# Patient Record
Sex: MALE | Race: BLACK OR AFRICAN AMERICAN | NOT HISPANIC OR LATINO
[De-identification: names, ages, dates, MRNs, and addresses within clinical notes are randomized per-mention and may not be internally consistent; named-entity substitution may affect disease eponyms.]

---

## 2018-07-31 ENCOUNTER — APPOINTMENT (OUTPATIENT)
Dept: SURGERY | Facility: CLINIC | Age: 43
End: 2018-07-31
Payer: COMMERCIAL

## 2018-07-31 VITALS
TEMPERATURE: 98.3 F | OXYGEN SATURATION: 98 % | HEART RATE: 61 BPM | SYSTOLIC BLOOD PRESSURE: 111 MMHG | DIASTOLIC BLOOD PRESSURE: 74 MMHG | WEIGHT: 285.38 LBS | BODY MASS INDEX: 36.64 KG/M2

## 2018-07-31 LAB
CA-I SERPL-SCNC: 1.24 MMOL/L
INR PPP: 1.03 RATIO
PT BLD: 11.7 SEC

## 2018-07-31 PROCEDURE — 99213 OFFICE O/P EST LOW 20 MIN: CPT

## 2018-08-01 LAB
25(OH)D3 SERPL-MCNC: 13.8 NG/ML
ALBUMIN SERPL ELPH-MCNC: 4.6 G/DL
ALP BLD-CCNC: 65 U/L
ALT SERPL-CCNC: 16 U/L
ANION GAP SERPL CALC-SCNC: 12 MMOL/L
AST SERPL-CCNC: 14 U/L
BASOPHILS # BLD AUTO: 0.01 K/UL
BASOPHILS NFR BLD AUTO: 0.2 %
BILIRUB SERPL-MCNC: 0.5 MG/DL
BUN SERPL-MCNC: 10 MG/DL
CALCIUM SERPL-MCNC: 9.7 MG/DL
CALCIUM SERPL-MCNC: 9.7 MG/DL
CHLORIDE SERPL-SCNC: 102 MMOL/L
CHOLEST SERPL-MCNC: 201 MG/DL
CHOLEST/HDLC SERPL: 3.5 RATIO
CO2 SERPL-SCNC: 29 MMOL/L
CREAT SERPL-MCNC: 0.81 MG/DL
EOSINOPHIL # BLD AUTO: 0.04 K/UL
EOSINOPHIL NFR BLD AUTO: 0.8 %
FOLATE SERPL-MCNC: 9 NG/ML
GLUCOSE SERPL-MCNC: 87 MG/DL
HBA1C MFR BLD HPLC: 6 %
HCT VFR BLD CALC: 40.9 %
HDLC SERPL-MCNC: 58 MG/DL
HGB BLD-MCNC: 12.4 G/DL
IMM GRANULOCYTES NFR BLD AUTO: 0.2 %
IRON SATN MFR SERPL: 28 %
IRON SERPL-MCNC: 77 UG/DL
LDLC SERPL CALC-MCNC: 122 MG/DL
LYMPHOCYTES # BLD AUTO: 1.86 K/UL
LYMPHOCYTES NFR BLD AUTO: 36.2 %
MAN DIFF?: NORMAL
MCHC RBC-ENTMCNC: 21.3 PG
MCHC RBC-ENTMCNC: 30.3 GM/DL
MCV RBC AUTO: 70.2 FL
MONOCYTES # BLD AUTO: 0.6 K/UL
MONOCYTES NFR BLD AUTO: 11.7 %
NEUTROPHILS # BLD AUTO: 2.62 K/UL
NEUTROPHILS NFR BLD AUTO: 50.9 %
PARATHYROID HORMONE INTACT: 71 PG/ML
PLATELET # BLD AUTO: 212 K/UL
POTASSIUM SERPL-SCNC: 4.4 MMOL/L
PREALB SERPL NEPH-MCNC: 24 MG/DL
PROT SERPL-MCNC: 7.6 G/DL
RBC # BLD: 5.83 M/UL
RBC # FLD: 16.4 %
SODIUM SERPL-SCNC: 143 MMOL/L
TIBC SERPL-MCNC: 277 UG/DL
TRIGL SERPL-MCNC: 106 MG/DL
TSH SERPL-ACNC: 1.55 UIU/ML
UIBC SERPL-MCNC: 200 UG/DL
VIT B12 SERPL-MCNC: 381 PG/ML
WBC # FLD AUTO: 5.14 K/UL

## 2018-08-03 LAB — ZINC SERPL-MCNC: 65 UG/DL

## 2018-08-04 LAB — VIT A SERPL-MCNC: 34 UG/DL

## 2018-08-05 LAB — VIT B1 SERPL-MCNC: 69.8 NMOL/L

## 2018-08-06 ENCOUNTER — CLINICAL ADVICE (OUTPATIENT)
Age: 43
End: 2018-08-06

## 2018-08-06 LAB
A-TOCOPHEROL VIT E SERPL-MCNC: 9.2 MG/L
BETA+GAMMA TOCOPHEROL SERPL-MCNC: 1.3 MG/L

## 2018-08-06 RX ORDER — ERGOCALCIFEROL 1.25 MG/1
1.25 MG CAPSULE, LIQUID FILLED ORAL
Qty: 4 | Refills: 2 | Status: ACTIVE | COMMUNITY
Start: 2018-08-06 | End: 1900-01-01

## 2018-09-04 ENCOUNTER — APPOINTMENT (OUTPATIENT)
Dept: SURGERY | Facility: CLINIC | Age: 43
End: 2018-09-04
Payer: COMMERCIAL

## 2018-09-04 VITALS
SYSTOLIC BLOOD PRESSURE: 120 MMHG | HEIGHT: 74 IN | BODY MASS INDEX: 36.57 KG/M2 | WEIGHT: 285 LBS | OXYGEN SATURATION: 95 % | TEMPERATURE: 98.5 F | HEART RATE: 67 BPM | DIASTOLIC BLOOD PRESSURE: 80 MMHG

## 2018-09-04 PROCEDURE — 99213 OFFICE O/P EST LOW 20 MIN: CPT

## 2018-09-18 ENCOUNTER — APPOINTMENT (OUTPATIENT)
Dept: SURGERY | Facility: CLINIC | Age: 43
End: 2018-09-18

## 2018-10-09 ENCOUNTER — APPOINTMENT (OUTPATIENT)
Dept: SURGERY | Facility: CLINIC | Age: 43
End: 2018-10-09
Payer: COMMERCIAL

## 2018-10-09 VITALS
OXYGEN SATURATION: 96 % | DIASTOLIC BLOOD PRESSURE: 78 MMHG | WEIGHT: 281 LBS | HEIGHT: 74 IN | SYSTOLIC BLOOD PRESSURE: 119 MMHG | TEMPERATURE: 97.4 F | BODY MASS INDEX: 36.06 KG/M2 | HEART RATE: 84 BPM

## 2018-10-09 PROCEDURE — 99212 OFFICE O/P EST SF 10 MIN: CPT

## 2018-10-30 ENCOUNTER — OUTPATIENT (OUTPATIENT)
Dept: OUTPATIENT SERVICES | Facility: HOSPITAL | Age: 43
LOS: 1 days | End: 2018-10-30
Payer: COMMERCIAL

## 2018-10-30 DIAGNOSIS — Z01.818 ENCOUNTER FOR OTHER PREPROCEDURAL EXAMINATION: ICD-10-CM

## 2018-10-30 LAB
ALBUMIN SERPL ELPH-MCNC: 4.2 G/DL — SIGNIFICANT CHANGE UP (ref 3.3–5)
ALP SERPL-CCNC: 59 U/L — SIGNIFICANT CHANGE UP (ref 40–120)
ALT FLD-CCNC: 25 U/L — SIGNIFICANT CHANGE UP (ref 10–45)
ANION GAP SERPL CALC-SCNC: 12 MMOL/L — SIGNIFICANT CHANGE UP (ref 5–17)
APPEARANCE UR: CLEAR — SIGNIFICANT CHANGE UP
APTT BLD: 37 SEC — HIGH (ref 27.5–36.3)
AST SERPL-CCNC: 17 U/L — SIGNIFICANT CHANGE UP (ref 10–40)
BACTERIA # UR AUTO: PRESENT /HPF
BASOPHILS NFR BLD AUTO: 0.2 % — SIGNIFICANT CHANGE UP (ref 0–2)
BILIRUB SERPL-MCNC: 0.4 MG/DL — SIGNIFICANT CHANGE UP (ref 0.2–1.2)
BILIRUB UR-MCNC: NEGATIVE — SIGNIFICANT CHANGE UP
BLD GP AB SCN SERPL QL: NEGATIVE — SIGNIFICANT CHANGE UP
BUN SERPL-MCNC: 11 MG/DL — SIGNIFICANT CHANGE UP (ref 7–23)
CALCIUM SERPL-MCNC: 9.5 MG/DL — SIGNIFICANT CHANGE UP (ref 8.4–10.5)
CHLORIDE SERPL-SCNC: 100 MMOL/L — SIGNIFICANT CHANGE UP (ref 96–108)
CO2 SERPL-SCNC: 29 MMOL/L — SIGNIFICANT CHANGE UP (ref 22–31)
COLOR SPEC: YELLOW — SIGNIFICANT CHANGE UP
CREAT SERPL-MCNC: 0.77 MG/DL — SIGNIFICANT CHANGE UP (ref 0.5–1.3)
DIFF PNL FLD: ABNORMAL
EOSINOPHIL NFR BLD AUTO: 1.1 % — SIGNIFICANT CHANGE UP (ref 0–6)
EPI CELLS # UR: SIGNIFICANT CHANGE UP /HPF (ref 0–5)
GLUCOSE SERPL-MCNC: 84 MG/DL — SIGNIFICANT CHANGE UP (ref 70–99)
GLUCOSE UR QL: NEGATIVE — SIGNIFICANT CHANGE UP
HBA1C BLD-MCNC: 5.5 % — SIGNIFICANT CHANGE UP (ref 4–5.6)
HCG UR QL: NEGATIVE — SIGNIFICANT CHANGE UP
HCT VFR BLD CALC: 41.4 % — SIGNIFICANT CHANGE UP (ref 39–50)
HGB BLD-MCNC: 12.7 G/DL — LOW (ref 13–17)
INR BLD: 1.01 — SIGNIFICANT CHANGE UP (ref 0.88–1.16)
KETONES UR-MCNC: NEGATIVE — SIGNIFICANT CHANGE UP
LEUKOCYTE ESTERASE UR-ACNC: NEGATIVE — SIGNIFICANT CHANGE UP
LYMPHOCYTES # BLD AUTO: 33.1 % — SIGNIFICANT CHANGE UP (ref 13–44)
MCHC RBC-ENTMCNC: 21.8 PG — LOW (ref 27–34)
MCHC RBC-ENTMCNC: 30.7 G/DL — LOW (ref 32–36)
MCV RBC AUTO: 71 FL — LOW (ref 80–100)
MONOCYTES NFR BLD AUTO: 12.9 % — SIGNIFICANT CHANGE UP (ref 2–14)
NEUTROPHILS NFR BLD AUTO: 52.7 % — SIGNIFICANT CHANGE UP (ref 43–77)
NITRITE UR-MCNC: NEGATIVE — SIGNIFICANT CHANGE UP
PH UR: 6 — SIGNIFICANT CHANGE UP (ref 5–8)
PLATELET # BLD AUTO: 203 K/UL — SIGNIFICANT CHANGE UP (ref 150–400)
POTASSIUM SERPL-MCNC: 4.2 MMOL/L — SIGNIFICANT CHANGE UP (ref 3.5–5.3)
POTASSIUM SERPL-SCNC: 4.2 MMOL/L — SIGNIFICANT CHANGE UP (ref 3.5–5.3)
PROT SERPL-MCNC: 7.2 G/DL — SIGNIFICANT CHANGE UP (ref 6–8.3)
PROT UR-MCNC: NEGATIVE MG/DL — SIGNIFICANT CHANGE UP
PROTHROM AB SERPL-ACNC: 11.4 SEC — SIGNIFICANT CHANGE UP (ref 10–12.9)
RBC # BLD: 5.83 M/UL — HIGH (ref 4.2–5.8)
RBC # FLD: 16.5 % — SIGNIFICANT CHANGE UP (ref 10.3–16.9)
RBC CASTS # UR COMP ASSIST: < 5 /HPF — SIGNIFICANT CHANGE UP
RH IG SCN BLD-IMP: POSITIVE — SIGNIFICANT CHANGE UP
SODIUM SERPL-SCNC: 141 MMOL/L — SIGNIFICANT CHANGE UP (ref 135–145)
SP GR SPEC: 1.02 — SIGNIFICANT CHANGE UP (ref 1–1.03)
TSH SERPL-MCNC: 0.94 UIU/ML — SIGNIFICANT CHANGE UP (ref 0.35–4.94)
UROBILINOGEN FLD QL: 0.2 E.U./DL — SIGNIFICANT CHANGE UP
WBC # BLD: 5.3 K/UL — SIGNIFICANT CHANGE UP (ref 3.8–10.5)
WBC # FLD AUTO: 5.3 K/UL — SIGNIFICANT CHANGE UP (ref 3.8–10.5)
WBC UR QL: < 5 /HPF — SIGNIFICANT CHANGE UP

## 2018-10-30 PROCEDURE — 93010 ELECTROCARDIOGRAM REPORT: CPT

## 2018-10-30 PROCEDURE — 71046 X-RAY EXAM CHEST 2 VIEWS: CPT

## 2018-10-30 PROCEDURE — 84443 ASSAY THYROID STIM HORMONE: CPT

## 2018-10-30 PROCEDURE — 81001 URINALYSIS AUTO W/SCOPE: CPT

## 2018-10-30 PROCEDURE — 83036 HEMOGLOBIN GLYCOSYLATED A1C: CPT

## 2018-10-30 PROCEDURE — 85610 PROTHROMBIN TIME: CPT

## 2018-10-30 PROCEDURE — 86901 BLOOD TYPING SEROLOGIC RH(D): CPT

## 2018-10-30 PROCEDURE — 71046 X-RAY EXAM CHEST 2 VIEWS: CPT | Mod: 26

## 2018-10-30 PROCEDURE — 81025 URINE PREGNANCY TEST: CPT

## 2018-10-30 PROCEDURE — 86850 RBC ANTIBODY SCREEN: CPT

## 2018-10-30 PROCEDURE — 93005 ELECTROCARDIOGRAM TRACING: CPT

## 2018-10-30 PROCEDURE — 85025 COMPLETE CBC W/AUTO DIFF WBC: CPT

## 2018-10-30 PROCEDURE — 80053 COMPREHEN METABOLIC PANEL: CPT

## 2018-10-30 PROCEDURE — 85730 THROMBOPLASTIN TIME PARTIAL: CPT

## 2018-10-30 PROCEDURE — 86900 BLOOD TYPING SEROLOGIC ABO: CPT

## 2018-11-05 ENCOUNTER — RESULT REVIEW (OUTPATIENT)
Age: 43
End: 2018-11-05

## 2018-11-05 ENCOUNTER — OUTPATIENT (OUTPATIENT)
Dept: OUTPATIENT SERVICES | Facility: HOSPITAL | Age: 43
LOS: 1 days | Discharge: HOME | End: 2018-11-05
Payer: COMMERCIAL

## 2018-11-05 VITALS
SYSTOLIC BLOOD PRESSURE: 129 MMHG | TEMPERATURE: 98 F | HEART RATE: 68 BPM | HEIGHT: 74 IN | DIASTOLIC BLOOD PRESSURE: 80 MMHG | WEIGHT: 292.77 LBS | RESPIRATION RATE: 20 BRPM | OXYGEN SATURATION: 96 %

## 2018-11-05 VITALS
DIASTOLIC BLOOD PRESSURE: 80 MMHG | RESPIRATION RATE: 14 BRPM | SYSTOLIC BLOOD PRESSURE: 137 MMHG | OXYGEN SATURATION: 99 % | HEART RATE: 82 BPM

## 2018-11-05 DIAGNOSIS — Z90.3 ACQUIRED ABSENCE OF STOMACH [PART OF]: Chronic | ICD-10-CM

## 2018-11-05 PROCEDURE — 88341 IMHCHEM/IMCYTCHM EA ADD ANTB: CPT

## 2018-11-05 PROCEDURE — 43239 EGD BIOPSY SINGLE/MULTIPLE: CPT

## 2018-11-05 PROCEDURE — 88305 TISSUE EXAM BY PATHOLOGIST: CPT

## 2018-11-05 RX ORDER — SODIUM CHLORIDE 9 MG/ML
1000 INJECTION, SOLUTION INTRAVENOUS
Qty: 0 | Refills: 0 | Status: DISCONTINUED | OUTPATIENT
Start: 2018-11-05 | End: 2018-11-05

## 2018-11-05 RX ORDER — ONDANSETRON 8 MG/1
4 TABLET, FILM COATED ORAL ONCE
Qty: 0 | Refills: 0 | Status: DISCONTINUED | OUTPATIENT
Start: 2018-11-05 | End: 2018-11-05

## 2018-11-05 NOTE — BRIEF OPERATIVE NOTE - OPERATION/FINDINGS
Diaphragm at 40cm, squamocolumnar junction at 38cm -- suggestion of hiatal hernia, biopsies x 4 obtained.

## 2018-11-05 NOTE — BRIEF OPERATIVE NOTE - PROCEDURE
<<-----Click on this checkbox to enter Procedure Endoscopy in operating room  11/05/2018    Active  CCRIPPS

## 2018-11-05 NOTE — PACU DISCHARGE NOTE - COMMENTS
Discharge Instructions reviewed with patient using teach back method. Copies given. Given Ginger Ale tolerated. IV removed intact. No signs of phlebitis. Ambulating steadily. Discharged accompanied by brother

## 2018-11-06 ENCOUNTER — APPOINTMENT (OUTPATIENT)
Dept: SURGERY | Facility: CLINIC | Age: 43
End: 2018-11-06
Payer: COMMERCIAL

## 2018-11-06 ENCOUNTER — LABORATORY RESULT (OUTPATIENT)
Age: 43
End: 2018-11-06

## 2018-11-06 VITALS
DIASTOLIC BLOOD PRESSURE: 63 MMHG | TEMPERATURE: 97.8 F | SYSTOLIC BLOOD PRESSURE: 95 MMHG | OXYGEN SATURATION: 99 % | BODY MASS INDEX: 36.32 KG/M2 | HEIGHT: 74 IN | HEART RATE: 105 BPM | WEIGHT: 283 LBS

## 2018-11-06 LAB
BASOPHILS # BLD AUTO: 0 K/UL
BASOPHILS NFR BLD AUTO: 0 %
EOSINOPHIL # BLD AUTO: 0 K/UL
EOSINOPHIL NFR BLD AUTO: 0 %
HCT VFR BLD CALC: 34.3 %
HGB BLD-MCNC: 10.7 G/DL
LYMPHOCYTES # BLD AUTO: 2.38 K/UL
LYMPHOCYTES NFR BLD AUTO: 25.9 %
MAN DIFF?: NORMAL
MCHC RBC-ENTMCNC: 21.9 PG
MCHC RBC-ENTMCNC: 31.2 GM/DL
MCV RBC AUTO: 70.3 FL
MONOCYTES # BLD AUTO: 0.95 K/UL
MONOCYTES NFR BLD AUTO: 10.3 %
NEUTROPHILS # BLD AUTO: 5.78 K/UL
NEUTROPHILS NFR BLD AUTO: 62.9 %
PLATELET # BLD AUTO: 215 K/UL
RBC # BLD: 4.88 M/UL
RBC # FLD: 16.6 %
SURGICAL PATHOLOGY STUDY: SIGNIFICANT CHANGE UP
WBC # FLD AUTO: 9.19 K/UL

## 2018-11-06 PROCEDURE — 99212 OFFICE O/P EST SF 10 MIN: CPT

## 2018-11-09 ENCOUNTER — APPOINTMENT (OUTPATIENT)
Dept: SURGERY | Facility: CLINIC | Age: 43
End: 2018-11-09

## 2018-11-09 PROBLEM — M10.9 GOUT, UNSPECIFIED: Chronic | Status: ACTIVE | Noted: 2018-11-05

## 2018-11-09 PROBLEM — I10 ESSENTIAL (PRIMARY) HYPERTENSION: Chronic | Status: ACTIVE | Noted: 2018-11-05

## 2018-11-09 PROBLEM — E66.01 MORBID (SEVERE) OBESITY DUE TO EXCESS CALORIES: Chronic | Status: ACTIVE | Noted: 2018-11-05

## 2018-11-19 LAB
BASOPHILS # BLD AUTO: 0.02 K/UL
BASOPHILS NFR BLD AUTO: 0.4 %
EOSINOPHIL # BLD AUTO: 0.04 K/UL
EOSINOPHIL NFR BLD AUTO: 0.8 %
HCT VFR BLD CALC: 31.4 %
HGB BLD-MCNC: 9.5 G/DL
IMM GRANULOCYTES NFR BLD AUTO: 0.2 %
LYMPHOCYTES # BLD AUTO: 1.88 K/UL
LYMPHOCYTES NFR BLD AUTO: 39.7 %
MAN DIFF?: NORMAL
MCHC RBC-ENTMCNC: 22.1 PG
MCHC RBC-ENTMCNC: 30.3 GM/DL
MCV RBC AUTO: 73 FL
MONOCYTES # BLD AUTO: 0.54 K/UL
MONOCYTES NFR BLD AUTO: 11.4 %
NEUTROPHILS # BLD AUTO: 2.25 K/UL
NEUTROPHILS NFR BLD AUTO: 47.5 %
PLATELET # BLD AUTO: 256 K/UL
RBC # BLD: 4.3 M/UL
RBC # FLD: 17 %
WBC # FLD AUTO: 4.74 K/UL

## 2018-12-13 DIAGNOSIS — E55.9 VITAMIN D DEFICIENCY, UNSPECIFIED: ICD-10-CM

## 2018-12-14 VITALS — WEIGHT: 286.5 LBS | BODY MASS INDEX: 36.77 KG/M2 | HEIGHT: 74 IN

## 2018-12-24 RX ORDER — ERGOCALCIFEROL 1.25 MG/1
1.25 MG CAPSULE, LIQUID FILLED ORAL
Qty: 12 | Refills: 0 | Status: ACTIVE | COMMUNITY
Start: 2018-12-13

## 2019-01-08 ENCOUNTER — APPOINTMENT (OUTPATIENT)
Dept: SURGERY | Facility: CLINIC | Age: 44
End: 2019-01-08
Payer: COMMERCIAL

## 2019-01-08 VITALS
OXYGEN SATURATION: 98 % | BODY MASS INDEX: 36.83 KG/M2 | HEIGHT: 74 IN | TEMPERATURE: 97.7 F | SYSTOLIC BLOOD PRESSURE: 129 MMHG | HEART RATE: 74 BPM | DIASTOLIC BLOOD PRESSURE: 74 MMHG | WEIGHT: 287 LBS

## 2019-01-08 DIAGNOSIS — Z98.84 BARIATRIC SURGERY STATUS: ICD-10-CM

## 2019-01-08 PROCEDURE — 99213 OFFICE O/P EST LOW 20 MIN: CPT

## 2019-01-08 NOTE — HISTORY OF PRESENT ILLNESS
[de-identified] : Pt is a 44 y/o M who presents today for his final visit for bariatric surgery. Pt has completed all of his bariatric workup and monthly weigh ins. Pt is scheduled in february for revision surgery. All questions were answered, pt is ready for his procedure.

## 2019-02-01 VITALS
DIASTOLIC BLOOD PRESSURE: 73 MMHG | HEIGHT: 74 IN | TEMPERATURE: 98 F | RESPIRATION RATE: 18 BRPM | WEIGHT: 283.73 LBS | HEART RATE: 76 BPM | OXYGEN SATURATION: 99 % | SYSTOLIC BLOOD PRESSURE: 123 MMHG

## 2019-02-04 ENCOUNTER — RESULT REVIEW (OUTPATIENT)
Age: 44
End: 2019-02-04

## 2019-02-04 ENCOUNTER — APPOINTMENT (OUTPATIENT)
Age: 44
End: 2019-02-04

## 2019-02-04 ENCOUNTER — INPATIENT (INPATIENT)
Facility: HOSPITAL | Age: 44
LOS: 1 days | Discharge: HOME | DRG: 621 | End: 2019-02-06
Attending: SURGERY | Admitting: SURGERY
Payer: COMMERCIAL

## 2019-02-04 DIAGNOSIS — Z90.3 ACQUIRED ABSENCE OF STOMACH [PART OF]: Chronic | ICD-10-CM

## 2019-02-04 LAB
BLD GP AB SCN SERPL QL: NEGATIVE — SIGNIFICANT CHANGE UP
HCT VFR BLD CALC: 30.4 % — LOW (ref 39–50)
HGB BLD-MCNC: 9.1 G/DL — LOW (ref 13–17)
MCHC RBC-ENTMCNC: 18.4 PG — LOW (ref 27–34)
MCHC RBC-ENTMCNC: 29.9 G/DL — LOW (ref 32–36)
MCV RBC AUTO: 61.5 FL — LOW (ref 80–100)
PLATELET # BLD AUTO: 243 K/UL — SIGNIFICANT CHANGE UP (ref 150–400)
RBC # BLD: 4.94 M/UL — SIGNIFICANT CHANGE UP (ref 4.2–5.8)
RBC # FLD: 17.4 % — HIGH (ref 10.3–16.9)
RH IG SCN BLD-IMP: POSITIVE — SIGNIFICANT CHANGE UP
WBC # BLD: 8.4 K/UL — SIGNIFICANT CHANGE UP (ref 3.8–10.5)
WBC # FLD AUTO: 8.4 K/UL — SIGNIFICANT CHANGE UP (ref 3.8–10.5)

## 2019-02-04 PROCEDURE — 43645 LAP GASTR BYPASS INCL SMLL I: CPT | Mod: GC

## 2019-02-04 RX ORDER — ONDANSETRON 8 MG/1
4 TABLET, FILM COATED ORAL EVERY 6 HOURS
Qty: 0 | Refills: 0 | Status: DISCONTINUED | OUTPATIENT
Start: 2019-02-04 | End: 2019-02-06

## 2019-02-04 RX ORDER — HYDROMORPHONE HYDROCHLORIDE 2 MG/ML
0.5 INJECTION INTRAMUSCULAR; INTRAVENOUS; SUBCUTANEOUS
Qty: 0 | Refills: 0 | Status: DISCONTINUED | OUTPATIENT
Start: 2019-02-04 | End: 2019-02-05

## 2019-02-04 RX ORDER — SODIUM CHLORIDE 9 MG/ML
1000 INJECTION, SOLUTION INTRAVENOUS
Qty: 0 | Refills: 0 | Status: DISCONTINUED | OUTPATIENT
Start: 2019-02-04 | End: 2019-02-05

## 2019-02-04 RX ORDER — HYDROMORPHONE HYDROCHLORIDE 2 MG/ML
0.5 INJECTION INTRAMUSCULAR; INTRAVENOUS; SUBCUTANEOUS ONCE
Qty: 0 | Refills: 0 | Status: DISCONTINUED | OUTPATIENT
Start: 2019-02-04 | End: 2019-02-04

## 2019-02-04 RX ORDER — ENOXAPARIN SODIUM 100 MG/ML
40 INJECTION SUBCUTANEOUS ONCE
Qty: 0 | Refills: 0 | Status: COMPLETED | OUTPATIENT
Start: 2019-02-04 | End: 2019-02-04

## 2019-02-04 RX ORDER — INDOMETHACIN 50 MG
1 CAPSULE ORAL
Qty: 0 | Refills: 0 | COMMUNITY

## 2019-02-04 RX ORDER — SCOPALAMINE 1 MG/3D
1 PATCH, EXTENDED RELEASE TRANSDERMAL ONCE
Qty: 0 | Refills: 0 | Status: COMPLETED | OUTPATIENT
Start: 2019-02-04 | End: 2019-02-04

## 2019-02-04 RX ORDER — COLCHICINE 0.6 MG
1 TABLET ORAL
Qty: 0 | Refills: 0 | COMMUNITY

## 2019-02-04 RX ORDER — METOCLOPRAMIDE HCL 10 MG
10 TABLET ORAL ONCE
Qty: 0 | Refills: 0 | Status: DISCONTINUED | OUTPATIENT
Start: 2019-02-04 | End: 2019-02-06

## 2019-02-04 RX ORDER — ENOXAPARIN SODIUM 100 MG/ML
30 INJECTION SUBCUTANEOUS EVERY 12 HOURS
Qty: 0 | Refills: 0 | Status: DISCONTINUED | OUTPATIENT
Start: 2019-02-04 | End: 2019-02-06

## 2019-02-04 RX ORDER — HYDROMORPHONE HYDROCHLORIDE 2 MG/ML
1 INJECTION INTRAMUSCULAR; INTRAVENOUS; SUBCUTANEOUS
Qty: 0 | Refills: 0 | Status: DISCONTINUED | OUTPATIENT
Start: 2019-02-04 | End: 2019-02-05

## 2019-02-04 RX ORDER — PANTOPRAZOLE SODIUM 20 MG/1
40 TABLET, DELAYED RELEASE ORAL DAILY
Qty: 0 | Refills: 0 | Status: DISCONTINUED | OUTPATIENT
Start: 2019-02-04 | End: 2019-02-06

## 2019-02-04 RX ADMIN — HYDROMORPHONE HYDROCHLORIDE 1 MILLIGRAM(S): 2 INJECTION INTRAMUSCULAR; INTRAVENOUS; SUBCUTANEOUS at 14:44

## 2019-02-04 RX ADMIN — SCOPALAMINE 1 PATCH: 1 PATCH, EXTENDED RELEASE TRANSDERMAL at 06:44

## 2019-02-04 RX ADMIN — HYDROMORPHONE HYDROCHLORIDE 0.5 MILLIGRAM(S): 2 INJECTION INTRAMUSCULAR; INTRAVENOUS; SUBCUTANEOUS at 12:12

## 2019-02-04 RX ADMIN — HYDROMORPHONE HYDROCHLORIDE 1 MILLIGRAM(S): 2 INJECTION INTRAMUSCULAR; INTRAVENOUS; SUBCUTANEOUS at 15:25

## 2019-02-04 RX ADMIN — ENOXAPARIN SODIUM 30 MILLIGRAM(S): 100 INJECTION SUBCUTANEOUS at 22:09

## 2019-02-04 RX ADMIN — HYDROMORPHONE HYDROCHLORIDE 1 MILLIGRAM(S): 2 INJECTION INTRAMUSCULAR; INTRAVENOUS; SUBCUTANEOUS at 17:59

## 2019-02-04 RX ADMIN — HYDROMORPHONE HYDROCHLORIDE 0.5 MILLIGRAM(S): 2 INJECTION INTRAMUSCULAR; INTRAVENOUS; SUBCUTANEOUS at 12:25

## 2019-02-04 RX ADMIN — SCOPALAMINE 1 PATCH: 1 PATCH, EXTENDED RELEASE TRANSDERMAL at 18:02

## 2019-02-04 RX ADMIN — HYDROMORPHONE HYDROCHLORIDE 1 MILLIGRAM(S): 2 INJECTION INTRAMUSCULAR; INTRAVENOUS; SUBCUTANEOUS at 21:16

## 2019-02-04 RX ADMIN — HYDROMORPHONE HYDROCHLORIDE 0.5 MILLIGRAM(S): 2 INJECTION INTRAMUSCULAR; INTRAVENOUS; SUBCUTANEOUS at 11:53

## 2019-02-04 RX ADMIN — HYDROMORPHONE HYDROCHLORIDE 1 MILLIGRAM(S): 2 INJECTION INTRAMUSCULAR; INTRAVENOUS; SUBCUTANEOUS at 22:16

## 2019-02-04 RX ADMIN — HYDROMORPHONE HYDROCHLORIDE 1 MILLIGRAM(S): 2 INJECTION INTRAMUSCULAR; INTRAVENOUS; SUBCUTANEOUS at 18:10

## 2019-02-04 RX ADMIN — ENOXAPARIN SODIUM 40 MILLIGRAM(S): 100 INJECTION SUBCUTANEOUS at 06:44

## 2019-02-04 NOTE — PROGRESS NOTE ADULT - SUBJECTIVE AND OBJECTIVE BOX
Surgery Post-Op Note    Pre-Op Dx: morbid obesity  Procedure: Laparoscopic gastric bypass    Surgeon: Dean    Subjective: Pain controlled. Denies N/V, CP, SOB.      Vital Signs Last 24 Hrs  T(C): 36.4 (04 Feb 2019 11:45), Max: 36.4 (04 Feb 2019 11:45)  T(F): 97.6 (04 Feb 2019 11:45), Max: 97.6 (04 Feb 2019 11:45)  HR: 73 (04 Feb 2019 13:30) (73 - 80)  BP: 130/77 (04 Feb 2019 13:30) (120/63 - 133/75)  BP(mean): 90 (04 Feb 2019 12:45) (83 - 91)  RR: 18 (04 Feb 2019 13:30) (15 - 20)  SpO2: 100% (04 Feb 2019 13:30) (98% - 100%)    Physical Exam:  General: NAD, resting comfortably in bed  Pulmonary: Nonlabored breathing, no respiratory distress  Cardiovascular: NSR  Abdominal: soft, NT/ND, incisions CDI  Extremities: WWP, normal strength  Neuro: A/O x 3, CNs II-XII grossly intact, no focal deficits, normal sensation        LABS:            CAPILLARY BLOOD GLUCOSE            ABO Interpretation: O (02-04 @ 06:38)

## 2019-02-04 NOTE — BRIEF OPERATIVE NOTE - OPERATION/FINDINGS
Findings of sleeve gastrectomy. Adhesions to liver and omentum taken down sharply. Hiatal hernia repaired with running 1-Prolene barbed suture. Gastric pouch created over 32 Fr bougie. Tiny residual portion of fundus resected. Gastrojejunostomy done in two layers hand-sewn with 2-0 Ethibond in an ante-colic, retro-gastric fashion. Leak test with methylene blue negative. BP limb 150 cm, Lexii limb - 150 cm. Mesenteric defects closed with 0-Prolene barbed suture.

## 2019-02-04 NOTE — BRIEF OPERATIVE NOTE - PROCEDURE
<<-----Click on this checkbox to enter Procedure Laparoscopic gastric bypass  02/04/2019    Active  REMY

## 2019-02-04 NOTE — H&P ADULT - PMH
Gout    HTN (hypertension)    Hypercholesteremia    Morbid obesity    EDILMA (obstructive sleep apnea)  does not use CPAP

## 2019-02-04 NOTE — H&P ADULT - HISTORY OF PRESENT ILLNESS
42 yo M with morbid obesity - BMI 37 - and history of sleeve gastrectomy in 2015 with 130lb weight loss, now presenting with weight gain.  He is scheduled for gastric bypass today. PMH includes HTN, EDILMA and hypercholesterolemia

## 2019-02-04 NOTE — PROGRESS NOTE ADULT - ASSESSMENT
44 yo M w/ hx gout, HTN, EDILMA, morbid obesity (BMI 37), sleeve gastrectomy in 2015 w/ Dr. Moran, now s/p revision to RYGB and hiatal hernia repair    - pain nausea control  - NPO/IVF  - PPI  - lovenox BID  - funes  - OOB  - swallow study

## 2019-02-05 ENCOUNTER — TRANSCRIPTION ENCOUNTER (OUTPATIENT)
Age: 44
End: 2019-02-05

## 2019-02-05 LAB
ANION GAP SERPL CALC-SCNC: 11 MMOL/L — SIGNIFICANT CHANGE UP (ref 5–17)
BASOPHILS NFR BLD AUTO: 0.1 % — SIGNIFICANT CHANGE UP (ref 0–2)
BUN SERPL-MCNC: 9 MG/DL — SIGNIFICANT CHANGE UP (ref 7–23)
CALCIUM SERPL-MCNC: 9.2 MG/DL — SIGNIFICANT CHANGE UP (ref 8.4–10.5)
CHLORIDE SERPL-SCNC: 100 MMOL/L — SIGNIFICANT CHANGE UP (ref 96–108)
CO2 SERPL-SCNC: 26 MMOL/L — SIGNIFICANT CHANGE UP (ref 22–31)
CREAT SERPL-MCNC: 0.81 MG/DL — SIGNIFICANT CHANGE UP (ref 0.5–1.3)
EOSINOPHIL NFR BLD AUTO: 0 % — SIGNIFICANT CHANGE UP (ref 0–6)
GLUCOSE SERPL-MCNC: 98 MG/DL — SIGNIFICANT CHANGE UP (ref 70–99)
HCT VFR BLD CALC: 28.7 % — LOW (ref 39–50)
HGB BLD-MCNC: 8.5 G/DL — LOW (ref 13–17)
LYMPHOCYTES # BLD AUTO: 15.5 % — SIGNIFICANT CHANGE UP (ref 13–44)
MAGNESIUM SERPL-MCNC: 1.9 MG/DL — SIGNIFICANT CHANGE UP (ref 1.6–2.6)
MCHC RBC-ENTMCNC: 18.5 PG — LOW (ref 27–34)
MCHC RBC-ENTMCNC: 29.6 G/DL — LOW (ref 32–36)
MCV RBC AUTO: 62.4 FL — LOW (ref 80–100)
MONOCYTES NFR BLD AUTO: 13.8 % — SIGNIFICANT CHANGE UP (ref 2–14)
NEUTROPHILS NFR BLD AUTO: 70.6 % — SIGNIFICANT CHANGE UP (ref 43–77)
PHOSPHATE SERPL-MCNC: 3.7 MG/DL — SIGNIFICANT CHANGE UP (ref 2.5–4.5)
PLATELET # BLD AUTO: 194 K/UL — SIGNIFICANT CHANGE UP (ref 150–400)
POTASSIUM SERPL-MCNC: 4 MMOL/L — SIGNIFICANT CHANGE UP (ref 3.5–5.3)
POTASSIUM SERPL-SCNC: 4 MMOL/L — SIGNIFICANT CHANGE UP (ref 3.5–5.3)
RBC # BLD: 4.6 M/UL — SIGNIFICANT CHANGE UP (ref 4.2–5.8)
RBC # FLD: 17 % — HIGH (ref 10.3–16.9)
SODIUM SERPL-SCNC: 137 MMOL/L — SIGNIFICANT CHANGE UP (ref 135–145)
WBC # BLD: 6.8 K/UL — SIGNIFICANT CHANGE UP (ref 3.8–10.5)
WBC # FLD AUTO: 6.8 K/UL — SIGNIFICANT CHANGE UP (ref 3.8–10.5)

## 2019-02-05 PROCEDURE — 74241: CPT | Mod: 26

## 2019-02-05 RX ORDER — SODIUM CHLORIDE 9 MG/ML
1000 INJECTION, SOLUTION INTRAVENOUS
Qty: 0 | Refills: 0 | Status: DISCONTINUED | OUTPATIENT
Start: 2019-02-05 | End: 2019-02-06

## 2019-02-05 RX ORDER — HYDROMORPHONE HYDROCHLORIDE 2 MG/ML
0.5 INJECTION INTRAMUSCULAR; INTRAVENOUS; SUBCUTANEOUS ONCE
Qty: 0 | Refills: 0 | Status: DISCONTINUED | OUTPATIENT
Start: 2019-02-05 | End: 2019-02-05

## 2019-02-05 RX ORDER — OXYCODONE AND ACETAMINOPHEN 5; 325 MG/1; MG/1
1 TABLET ORAL EVERY 4 HOURS
Qty: 0 | Refills: 0 | Status: DISCONTINUED | OUTPATIENT
Start: 2019-02-05 | End: 2019-02-06

## 2019-02-05 RX ORDER — OXYCODONE AND ACETAMINOPHEN 5; 325 MG/1; MG/1
2 TABLET ORAL EVERY 4 HOURS
Qty: 0 | Refills: 0 | Status: DISCONTINUED | OUTPATIENT
Start: 2019-02-05 | End: 2019-02-06

## 2019-02-05 RX ORDER — MAGNESIUM SULFATE 500 MG/ML
1 VIAL (ML) INJECTION ONCE
Qty: 0 | Refills: 0 | Status: COMPLETED | OUTPATIENT
Start: 2019-02-05 | End: 2019-02-05

## 2019-02-05 RX ORDER — ACETAMINOPHEN 500 MG
1000 TABLET ORAL ONCE
Qty: 0 | Refills: 0 | Status: DISCONTINUED | OUTPATIENT
Start: 2019-02-05 | End: 2019-02-06

## 2019-02-05 RX ADMIN — HYDROMORPHONE HYDROCHLORIDE 0.5 MILLIGRAM(S): 2 INJECTION INTRAMUSCULAR; INTRAVENOUS; SUBCUTANEOUS at 02:20

## 2019-02-05 RX ADMIN — OXYCODONE AND ACETAMINOPHEN 2 TABLET(S): 5; 325 TABLET ORAL at 15:37

## 2019-02-05 RX ADMIN — HYDROMORPHONE HYDROCHLORIDE 0.5 MILLIGRAM(S): 2 INJECTION INTRAMUSCULAR; INTRAVENOUS; SUBCUTANEOUS at 05:46

## 2019-02-05 RX ADMIN — HYDROMORPHONE HYDROCHLORIDE 1 MILLIGRAM(S): 2 INJECTION INTRAMUSCULAR; INTRAVENOUS; SUBCUTANEOUS at 09:32

## 2019-02-05 RX ADMIN — OXYCODONE AND ACETAMINOPHEN 2 TABLET(S): 5; 325 TABLET ORAL at 23:27

## 2019-02-05 RX ADMIN — Medication 100 GRAM(S): at 08:22

## 2019-02-05 RX ADMIN — HYDROMORPHONE HYDROCHLORIDE 0.5 MILLIGRAM(S): 2 INJECTION INTRAMUSCULAR; INTRAVENOUS; SUBCUTANEOUS at 07:25

## 2019-02-05 RX ADMIN — HYDROMORPHONE HYDROCHLORIDE 0.5 MILLIGRAM(S): 2 INJECTION INTRAMUSCULAR; INTRAVENOUS; SUBCUTANEOUS at 22:15

## 2019-02-05 RX ADMIN — HYDROMORPHONE HYDROCHLORIDE 1 MILLIGRAM(S): 2 INJECTION INTRAMUSCULAR; INTRAVENOUS; SUBCUTANEOUS at 09:49

## 2019-02-05 RX ADMIN — ENOXAPARIN SODIUM 30 MILLIGRAM(S): 100 INJECTION SUBCUTANEOUS at 09:37

## 2019-02-05 RX ADMIN — HYDROMORPHONE HYDROCHLORIDE 0.5 MILLIGRAM(S): 2 INJECTION INTRAMUSCULAR; INTRAVENOUS; SUBCUTANEOUS at 21:23

## 2019-02-05 RX ADMIN — SCOPALAMINE 1 PATCH: 1 PATCH, EXTENDED RELEASE TRANSDERMAL at 20:05

## 2019-02-05 RX ADMIN — OXYCODONE AND ACETAMINOPHEN 2 TABLET(S): 5; 325 TABLET ORAL at 15:56

## 2019-02-05 RX ADMIN — PANTOPRAZOLE SODIUM 40 MILLIGRAM(S): 20 TABLET, DELAYED RELEASE ORAL at 18:37

## 2019-02-05 RX ADMIN — ENOXAPARIN SODIUM 30 MILLIGRAM(S): 100 INJECTION SUBCUTANEOUS at 18:36

## 2019-02-05 RX ADMIN — HYDROMORPHONE HYDROCHLORIDE 0.5 MILLIGRAM(S): 2 INJECTION INTRAMUSCULAR; INTRAVENOUS; SUBCUTANEOUS at 01:28

## 2019-02-05 NOTE — DISCHARGE NOTE ADULT - HOSPITAL COURSE
44 yo M w/ hx gout, HTN, EDILMA, morbid obesity (BMI 37), sleeve gastrectomy in 2015 w/ Dr. Moran. Presented to [facility] on 2/4 and underwent revision to RYGB and hiatal hernia repair. Procedure was uncomplicated and post-op course was uneventful. UGI on POD#1 was negative for leak or obstruction and diet was advanced to bariatric clear liquids. At time of discharge the patient was tolerating an adequate amount of po intake and was stable and ready for discharge with follow-up as outpatient.

## 2019-02-05 NOTE — DISCHARGE NOTE ADULT - ADDITIONAL INSTRUCTIONS
1) Please take Percocet 1 to 2 tabs by mouth every 4 to 6 hours as needed for severe pain control.  2) Please take Tylenol 650 mg every 4 to 6 hours by mouth for moderate pain control.   3) Please take Protonix 40 mg once a day by mouth.

## 2019-02-05 NOTE — PROGRESS NOTE ADULT - SUBJECTIVE AND OBJECTIVE BOX
OVERNIGHT EVENTS: pain controlled, encouraged IS, and ambulating, good urine o/p, post op h/h: 9.1/30.4  2/4: sleeve gastrectomy revision to RYGB, hiatal hernia repair; POC WNL    STATUS POST:   sleeve gastrectomy revision to RYGB, hiatal hernia repair    POST OPERATIVE DAY #: 1    SUBJECTIVE: Patient examined bedside with chief resident. Patient complains of incisional pain and nausea. Patient denies chest pain, SOB and vomiting.  Patient making adequate urine.      enoxaparin Injectable 30 milliGRAM(s) SubCutaneous every 12 hours      Vital Signs Last 24 Hrs  T(C): 36.9 (05 Feb 2019 05:25), Max: 37.1 (04 Feb 2019 14:35)  T(F): 98.4 (05 Feb 2019 05:25), Max: 98.7 (04 Feb 2019 14:35)  HR: 86 (05 Feb 2019 05:25) (58 - 86)  BP: 120/76 (05 Feb 2019 05:25) (112/72 - 143/93)  BP(mean): 90 (04 Feb 2019 12:45) (83 - 91)  RR: 17 (05 Feb 2019 05:25) (15 - 20)  SpO2: 95% (05 Feb 2019 05:25) (94% - 100%)  I&O's Detail    04 Feb 2019 07:01  -  05 Feb 2019 07:00  --------------------------------------------------------  IN:    lactated ringers.: 1050 mL  Total IN: 1050 mL    OUT:    Indwelling Catheter - Urethral: 1520 mL  Total OUT: 1520 mL    Total NET: -470 mL          General: NAD, resting comfortably in bed  C/V: NSR  Pulm: Nonlabored breathing, no respiratory distress  Abd:  Soft, non- distended, TTP around incision site, incision clean dry and intact   Extrem: WWP, no edema, SCDs in place        LABS:                        8.5    6.8   )-----------( 194      ( 05 Feb 2019 05:45 )             28.7     02-05    137  |  100  |  9   ----------------------------<  98  4.0   |  26  |  0.81    Ca    9.2      05 Feb 2019 05:45  Phos  3.7     02-05  Mg     1.9     02-05            RADIOLOGY & ADDITIONAL STUDIES:

## 2019-02-05 NOTE — DISCHARGE NOTE ADULT - PLAN OF CARE
Recovery Follow up with  in 1 week. Call the office at  to schedule your appointment. You may shower; soap and water over incision sites. Do not scrub. Pat dry when done. No tub bathing or swimming until cleared. Keep incision sites out of the sun as scars will darken. No heavy lifting (>10lbs) or strenuous exercise. Diet: Bariatric Full Fluids. 60 grams protein daily.  64 fluid ounces water daily. Drink small sips throughout the day. Continue diet as outlined by paperwork received as a pre-operative patient. You should be urinating at least 3-4x per day. Call the office if you experience increasing abdominal pain, nausea, vomiting, or temperature >100.4F.  NO ASPIRIN OR NSAIDs until approved by Dr. Moran. Avoid alcoholic beverages until cleared by Dr. Moran.

## 2019-02-05 NOTE — DIETITIAN INITIAL EVALUATION ADULT. - ENERGY NEEDS
Height: 6'2" Weight: 284lbs, IBW 190lbs+/-10%, %%, BMI 36.4  IBW used for calculations as pt >120% of IBW   Above energy needs calculated for wt maintenance (20-25kcal/kg).   Weeks 1-2 estimated needs: 861-1033kcal/day (10-12kcal/kg), 129-180g pro/day (1.5-2.1g/kg), >/=64oz clear fluids.

## 2019-02-05 NOTE — DISCHARGE NOTE ADULT - PATIENT PORTAL LINK FT
You can access the [email or web address] Patient Portal, offered by [facility], by registering with the following website: [email or web address]

## 2019-02-05 NOTE — DISCHARGE NOTE ADULT - CARE PLAN
Principal Discharge DX:	Morbid obesity  Goal:	Recovery  Assessment and plan of treatment:	Follow up with  in 1 week. Call the office at  to schedule your appointment. You may shower; soap and water over incision sites. Do not scrub. Pat dry when done. No tub bathing or swimming until cleared. Keep incision sites out of the sun as scars will darken. No heavy lifting (>10lbs) or strenuous exercise. Diet: Bariatric Full Fluids. 60 grams protein daily.  64 fluid ounces water daily. Drink small sips throughout the day. Continue diet as outlined by paperwork received as a pre-operative patient. You should be urinating at least 3-4x per day. Call the office if you experience increasing abdominal pain, nausea, vomiting, or temperature >100.4F.  NO ASPIRIN OR NSAIDs until approved by Dr. Moran. Avoid alcoholic beverages until cleared by Dr. Moran.

## 2019-02-05 NOTE — DIETITIAN INITIAL EVALUATION ADULT. - OTHER INFO
42yo M s/p sleeve gastrectomy revision to RYGB and hiatal hernia repair POD1. Pt seen resting in bed. Diet recently advaced to BARICLLIQ and pt is tolerating sips of water well. Reports looking forward to broth at next meal. Prepared w/ protein and vitamin supplements. RD provided indepth edu on diet advancement and specific nutrient needs s/p RYGB. Pt receptive and expressed understanding. Reports he has been taking a daily MVI since lap sleeve in 2015. NKFA or dietary restrictions. Skin: surgical incision; GI WDL per flowsheet.

## 2019-02-05 NOTE — PROGRESS NOTE ADULT - ASSESSMENT
42 yo M w/ hx gout, HTN, EDILMA, morbid obesity (BMI 37), sleeve gastrectomy in 2015 w/ Dr. Moran, now s/p revision to RYGB and hiatal hernia repair    - pain nausea control  - NPO/IVF  - PPI  - lovenox BID  - funes  - OOB  - UGI

## 2019-02-05 NOTE — DISCHARGE NOTE ADULT - MEDICATION SUMMARY - MEDICATIONS TO TAKE
I will START or STAY ON the medications listed below when I get home from the hospital:    indomethacin 50 mg oral capsule  -- 1 cap(s) by mouth 3 times a day, As Needed  -- Indication: For Gout    Percocet 5/325 oral tablet  -- 1 tab(s) by mouth every 6 hours, As Needed -for severe pain MDD:4   -- Caution federal law prohibits the transfer of this drug to any person other  than the person for whom it was prescribed.  May cause drowsiness.  Alcohol may intensify this effect.  Use care when operating dangerous machinery.  This prescription cannot be refilled.  This product contains acetaminophen.  Do not use  with any other product containing acetaminophen to prevent possible liver damage.  Using more of this medication than prescribed may cause serious breathing problems.    -- Indication: For Postoperative pain     colchicine 0.6 mg oral tablet  -- 1 tab(s) by mouth once a day, As Needed  -- Indication: For Gout     Protonix 40 mg oral delayed release tablet  -- 1 tab(s) by mouth once a day MDD:1  -- It is very important that you take or use this exactly as directed.  Do not skip doses or discontinue unless directed by your doctor.  Obtain medical advice before taking any non-prescription drugs as some may affect the action of this medication.  Swallow whole.  Do not crush.    -- Indication: For Postoperative acid reflux

## 2019-02-05 NOTE — DISCHARGE NOTE ADULT - CARE PROVIDER_API CALL
Harmeet Moran)  Surgery  [address] [place] [address]  Phone: [phone]  Fax: [phone]  Follow Up Time:

## 2019-02-06 VITALS
TEMPERATURE: 98 F | SYSTOLIC BLOOD PRESSURE: 133 MMHG | DIASTOLIC BLOOD PRESSURE: 83 MMHG | OXYGEN SATURATION: 96 % | HEART RATE: 72 BPM | RESPIRATION RATE: 18 BRPM

## 2019-02-06 PROBLEM — E78.00 PURE HYPERCHOLESTEROLEMIA, UNSPECIFIED: Chronic | Status: ACTIVE | Noted: 2019-02-01

## 2019-02-06 PROBLEM — G47.33 OBSTRUCTIVE SLEEP APNEA (ADULT) (PEDIATRIC): Chronic | Status: ACTIVE | Noted: 2019-02-01

## 2019-02-06 LAB
ANION GAP SERPL CALC-SCNC: 7 MMOL/L — SIGNIFICANT CHANGE UP (ref 5–17)
BUN SERPL-MCNC: 7 MG/DL — SIGNIFICANT CHANGE UP (ref 7–23)
CALCIUM SERPL-MCNC: 9.3 MG/DL — SIGNIFICANT CHANGE UP (ref 8.4–10.5)
CHLORIDE SERPL-SCNC: 96 MMOL/L — SIGNIFICANT CHANGE UP (ref 96–108)
CO2 SERPL-SCNC: 32 MMOL/L — HIGH (ref 22–31)
CREAT SERPL-MCNC: 0.87 MG/DL — SIGNIFICANT CHANGE UP (ref 0.5–1.3)
GLUCOSE SERPL-MCNC: 115 MG/DL — HIGH (ref 70–99)
HCT VFR BLD CALC: 30.4 % — LOW (ref 39–50)
HGB BLD-MCNC: 8.9 G/DL — LOW (ref 13–17)
MAGNESIUM SERPL-MCNC: 2 MG/DL — SIGNIFICANT CHANGE UP (ref 1.6–2.6)
MCHC RBC-ENTMCNC: 18.8 PG — LOW (ref 27–34)
MCHC RBC-ENTMCNC: 29.3 GM/DL — LOW (ref 32–36)
MCV RBC AUTO: 64.1 FL — LOW (ref 80–100)
NRBC # BLD: 0 /100 WBCS — SIGNIFICANT CHANGE UP (ref 0–0)
PHOSPHATE SERPL-MCNC: 2.3 MG/DL — LOW (ref 2.5–4.5)
PLATELET # BLD AUTO: 213 K/UL — SIGNIFICANT CHANGE UP (ref 150–400)
POTASSIUM SERPL-MCNC: 3.6 MMOL/L — SIGNIFICANT CHANGE UP (ref 3.5–5.3)
POTASSIUM SERPL-SCNC: 3.6 MMOL/L — SIGNIFICANT CHANGE UP (ref 3.5–5.3)
RBC # BLD: 4.74 M/UL — SIGNIFICANT CHANGE UP (ref 4.2–5.8)
RBC # FLD: 17.3 % — HIGH (ref 10.3–14.5)
SODIUM SERPL-SCNC: 135 MMOL/L — SIGNIFICANT CHANGE UP (ref 135–145)
WBC # BLD: 6.04 K/UL — SIGNIFICANT CHANGE UP (ref 3.8–10.5)
WBC # FLD AUTO: 6.04 K/UL — SIGNIFICANT CHANGE UP (ref 3.8–10.5)

## 2019-02-06 PROCEDURE — 83735 ASSAY OF MAGNESIUM: CPT

## 2019-02-06 PROCEDURE — 85025 COMPLETE CBC W/AUTO DIFF WBC: CPT

## 2019-02-06 PROCEDURE — 74241: CPT

## 2019-02-06 PROCEDURE — 86850 RBC ANTIBODY SCREEN: CPT

## 2019-02-06 PROCEDURE — 36415 COLL VENOUS BLD VENIPUNCTURE: CPT

## 2019-02-06 PROCEDURE — 86900 BLOOD TYPING SEROLOGIC ABO: CPT

## 2019-02-06 PROCEDURE — 80048 BASIC METABOLIC PNL TOTAL CA: CPT

## 2019-02-06 PROCEDURE — 88307 TISSUE EXAM BY PATHOLOGIST: CPT

## 2019-02-06 PROCEDURE — 84100 ASSAY OF PHOSPHORUS: CPT

## 2019-02-06 PROCEDURE — 85027 COMPLETE CBC AUTOMATED: CPT

## 2019-02-06 PROCEDURE — 86901 BLOOD TYPING SEROLOGIC RH(D): CPT

## 2019-02-06 PROCEDURE — 88341 IMHCHEM/IMCYTCHM EA ADD ANTB: CPT

## 2019-02-06 RX ORDER — PANTOPRAZOLE SODIUM 20 MG/1
1 TABLET, DELAYED RELEASE ORAL
Qty: 30 | Refills: 0 | OUTPATIENT
Start: 2019-02-06 | End: 2019-03-07

## 2019-02-06 RX ORDER — POTASSIUM PHOSPHATE, MONOBASIC POTASSIUM PHOSPHATE, DIBASIC 236; 224 MG/ML; MG/ML
15 INJECTION, SOLUTION INTRAVENOUS ONCE
Qty: 0 | Refills: 0 | Status: DISCONTINUED | OUTPATIENT
Start: 2019-02-06 | End: 2019-02-06

## 2019-02-06 RX ORDER — HYDROMORPHONE HYDROCHLORIDE 2 MG/ML
0.5 INJECTION INTRAMUSCULAR; INTRAVENOUS; SUBCUTANEOUS ONCE
Qty: 0 | Refills: 0 | Status: DISCONTINUED | OUTPATIENT
Start: 2019-02-06 | End: 2019-02-06

## 2019-02-06 RX ORDER — POTASSIUM CHLORIDE 20 MEQ
40 PACKET (EA) ORAL ONCE
Qty: 0 | Refills: 0 | Status: COMPLETED | OUTPATIENT
Start: 2019-02-06 | End: 2019-02-06

## 2019-02-06 RX ADMIN — OXYCODONE AND ACETAMINOPHEN 2 TABLET(S): 5; 325 TABLET ORAL at 00:00

## 2019-02-06 RX ADMIN — OXYCODONE AND ACETAMINOPHEN 2 TABLET(S): 5; 325 TABLET ORAL at 05:26

## 2019-02-06 RX ADMIN — OXYCODONE AND ACETAMINOPHEN 2 TABLET(S): 5; 325 TABLET ORAL at 04:55

## 2019-02-06 RX ADMIN — ENOXAPARIN SODIUM 30 MILLIGRAM(S): 100 INJECTION SUBCUTANEOUS at 07:51

## 2019-02-06 RX ADMIN — SODIUM CHLORIDE 100 MILLILITER(S): 9 INJECTION, SOLUTION INTRAVENOUS at 00:53

## 2019-02-06 RX ADMIN — Medication 40 MILLIEQUIVALENT(S): at 09:38

## 2019-02-06 RX ADMIN — SODIUM CHLORIDE 100 MILLILITER(S): 9 INJECTION, SOLUTION INTRAVENOUS at 09:38

## 2019-02-06 NOTE — PROGRESS NOTE ADULT - SUBJECTIVE AND OBJECTIVE BOX
OVERNIGHT EVENTS: pain controlled on meds, charito BCLD, encouraged pt to keep track of po intake and using IS, OOBA  2/5: swallow study without leak or obstruction, adv to BCLD, passed TOV    STATUS POST:  : sleeve gastrectomy revision to RYGB, hiatal hernia repair    POST OPERATIVE DAY #: 2    SUBJECTIVE: Patient examined bedside with chief resident. Patient reports incisional pain improved and tolerating diet. Patient denies SOB, chest pain, nausea and vomiting.  Patient making adequate urine.      enoxaparin Injectable 30 milliGRAM(s) SubCutaneous every 12 hours      Vital Signs Last 24 Hrs  T(C): 37.2 (06 Feb 2019 04:55), Max: 37.2 (06 Feb 2019 04:55)  T(F): 98.9 (06 Feb 2019 04:55), Max: 98.9 (06 Feb 2019 04:55)  HR: 80 (06 Feb 2019 04:55) (68 - 83)  BP: 143/94 (06 Feb 2019 04:55) (128/86 - 143/94)  BP(mean): --  RR: 18 (06 Feb 2019 04:55) (17 - 18)  SpO2: 95% (06 Feb 2019 04:55) (95% - 98%)  I&O's Detail    05 Feb 2019 07:01  -  06 Feb 2019 07:00  --------------------------------------------------------  IN:    dextrose 5% + sodium chloride 0.45%.: 1800 mL  Total IN: 1800 mL    OUT:    Voided: 2250 mL  Total OUT: 2250 mL    Total NET: -450 mL          General: NAD, resting comfortably in bed  C/V: NSR  Pulm: Nonlabored breathing, no respiratory distress  Abd:  Soft, non- distended, TTP around incision site, incision clean dry and intact   Extrem: WWP, no edema, SCDs in place      LABS:                        8.9    6.04  )-----------( 213      ( 06 Feb 2019 05:55 )             30.4     02-06    135  |  96  |  7   ----------------------------<  115<H>  3.6   |  32<H>  |  0.87    Ca    9.3      06 Feb 2019 05:55  Phos  2.3     02-06  Mg     2.0     02-06            RADIOLOGY & ADDITIONAL STUDIES:

## 2019-02-06 NOTE — PROGRESS NOTE ADULT - ASSESSMENT
44 yo M w/ hx gout, HTN, EDILMA, morbid obesity (BMI 37), sleeve gastrectomy in 2015 w/ Dr. Moran, now s/p revision to RYGB and hiatal hernia repair    -BCLD/IVF  - PPI  - lovenox BID  - OOBA  -f/u am labs   - possible discharge home today

## 2019-02-12 ENCOUNTER — APPOINTMENT (OUTPATIENT)
Dept: SURGERY | Facility: CLINIC | Age: 44
End: 2019-02-12
Payer: COMMERCIAL

## 2019-02-12 VITALS
SYSTOLIC BLOOD PRESSURE: 109 MMHG | TEMPERATURE: 98.5 F | HEIGHT: 74 IN | DIASTOLIC BLOOD PRESSURE: 72 MMHG | BODY MASS INDEX: 34.91 KG/M2 | HEART RATE: 97 BPM | OXYGEN SATURATION: 99 % | WEIGHT: 272 LBS

## 2019-02-12 PROCEDURE — 99024 POSTOP FOLLOW-UP VISIT: CPT

## 2019-02-12 NOTE — ASSESSMENT
[FreeTextEntry1] : 44 y/o M 1 week s/p revision of VSG to RYGB. Doing well. Reports pain and swelling to his b/l feet, likley gout. Recommended he do not take colchicine for this. Will follow-up here in 3 weeks.

## 2019-02-12 NOTE — HISTORY OF PRESENT ILLNESS
[de-identified] : 42 yo M w/ hx gout, HTN, EDILMA, morbid obesity (BMI 37), sleeve gastrectomy in 2015 w/ Dr. Moran. Presented to [facility] on 2/4 and underwent revision to RYGB and hiatal hernia repair. He is doing well. Tolerating diet. No fevers or chills. He does report pain and swelling in his b/l feet which he attributes to his history of gout.

## 2019-02-12 NOTE — END OF VISIT
[FreeTextEntry3] : All medical record entries made by the Scribe were at my, Dr. Moran's direction and personally dictated by me on 2/12/2019. I have reviewed the chart and agree that the record accurately reflects my personal performance of the history, physical exam, assessment and plan. I have also personally directed, reviewed, and agreed with the chart.

## 2019-02-19 DIAGNOSIS — M10.9 GOUT, UNSPECIFIED: ICD-10-CM

## 2019-02-19 DIAGNOSIS — E66.01 MORBID (SEVERE) OBESITY DUE TO EXCESS CALORIES: ICD-10-CM

## 2019-02-19 DIAGNOSIS — E78.00 PURE HYPERCHOLESTEROLEMIA, UNSPECIFIED: ICD-10-CM

## 2019-02-19 DIAGNOSIS — Z98.84 BARIATRIC SURGERY STATUS: ICD-10-CM

## 2019-02-19 DIAGNOSIS — I10 ESSENTIAL (PRIMARY) HYPERTENSION: ICD-10-CM

## 2019-02-19 DIAGNOSIS — K44.9 DIAPHRAGMATIC HERNIA WITHOUT OBSTRUCTION OR GANGRENE: ICD-10-CM

## 2019-02-19 DIAGNOSIS — Z90.3 ACQUIRED ABSENCE OF STOMACH [PART OF]: ICD-10-CM

## 2019-02-19 DIAGNOSIS — G47.33 OBSTRUCTIVE SLEEP APNEA (ADULT) (PEDIATRIC): ICD-10-CM

## 2019-02-19 DIAGNOSIS — K21.9 GASTRO-ESOPHAGEAL REFLUX DISEASE WITHOUT ESOPHAGITIS: ICD-10-CM

## 2019-02-20 ENCOUNTER — CLINICAL ADVICE (OUTPATIENT)
Age: 44
End: 2019-02-20

## 2019-03-05 ENCOUNTER — APPOINTMENT (OUTPATIENT)
Dept: SURGERY | Facility: CLINIC | Age: 44
End: 2019-03-05
Payer: COMMERCIAL

## 2019-03-05 VITALS
SYSTOLIC BLOOD PRESSURE: 120 MMHG | DIASTOLIC BLOOD PRESSURE: 80 MMHG | HEART RATE: 79 BPM | BODY MASS INDEX: 33.65 KG/M2 | WEIGHT: 262.25 LBS | TEMPERATURE: 98.5 F | OXYGEN SATURATION: 97 % | HEIGHT: 74 IN

## 2019-03-05 PROCEDURE — 99024 POSTOP FOLLOW-UP VISIT: CPT

## 2019-03-05 NOTE — HISTORY OF PRESENT ILLNESS
[de-identified] : Pt is a 42 y/o M 1 month s/p conversion of sleeve to bypass who presents today feeling well. Pt states he is having a hard time at home with his gout flare. States he stopped protein shakes, and is eating eggs, oatmeal, soft bean malissa, soups. States he is consuming over 8 cups of water daily. Denies n,v,c,d. Pt has not been taking indomethacin for gout. States he exercises every other day, riding his bike. Reports a 30 pound weight loss since surgery. Pt states that he is taking mv, b12, and iron will be prescribed today. Pt cleared to resume colchicine for gout.

## 2019-05-07 ENCOUNTER — APPOINTMENT (OUTPATIENT)
Dept: SURGERY | Facility: CLINIC | Age: 44
End: 2019-05-07
Payer: COMMERCIAL

## 2019-05-07 VITALS
BODY MASS INDEX: 30.32 KG/M2 | HEIGHT: 74 IN | OXYGEN SATURATION: 97 % | WEIGHT: 236.25 LBS | SYSTOLIC BLOOD PRESSURE: 116 MMHG | HEART RATE: 67 BPM | DIASTOLIC BLOOD PRESSURE: 79 MMHG | TEMPERATURE: 97.9 F

## 2019-05-07 DIAGNOSIS — E66.01 MORBID (SEVERE) OBESITY DUE TO EXCESS CALORIES: ICD-10-CM

## 2019-05-07 PROCEDURE — 99213 OFFICE O/P EST LOW 20 MIN: CPT

## 2019-05-07 RX ORDER — IRON POLYSACCHARIDE COMPLEX 150 MG
150 CAPSULE ORAL
Qty: 30 | Refills: 11 | Status: ACTIVE | COMMUNITY
Start: 2019-05-07 | End: 1900-01-01

## 2019-05-07 NOTE — HISTORY OF PRESENT ILLNESS
[de-identified] : Pt is a 42 y/o M 3 month s/p conversion of sleeve to bypass who presents today feeling well. Tolerating diet. He is taking about 2 protein shakes per day. He is exercising well. Reports about 4-5 BM per day. Adds that there is mostly gas. Pt is not taking ferrix. Denies acid reflux. Pt reports consuming oatmeal and wheat for fiber. Pt is compliant with his vitamins. Pt has lost 51 lbs since January and is happy with his results.

## 2019-05-07 NOTE — ASSESSMENT
[FreeTextEntry1] : Pt is a 42 y/o M 3 month s/p conversion of sleeve to bypass. Pt is doing well. Advised to continue diet and exercise. Suggested to increase fiber intake. Will see nutritionist for this. Pt to resume taking iron and is prescribed ferrix. Follow up in 3 months.

## 2019-05-07 NOTE — ADDENDUM
[FreeTextEntry1] : Documented by Raulito Ceballos acting as a scribe for Dr. Harmeet Moran on 05/07/2019\par

## 2019-05-07 NOTE — END OF VISIT
[FreeTextEntry3] : All medical record entries made by the Scribe were at my, Dr. Moran's direction and personally dictated by me on 05/07/2019  I have reviewed the chart and agree that the record accurately reflects my personal performance of the history, physical exam, assessment and plan. I have also personally directed, reviewed, and agreed with the chart.\par

## 2019-08-27 ENCOUNTER — APPOINTMENT (OUTPATIENT)
Dept: SURGERY | Facility: CLINIC | Age: 44
End: 2019-08-27

## 2019-08-27 NOTE — ASSESSMENT
[FreeTextEntry1] : 45 y/o M 6 months s/p conversion of VSG to gastric bypass presents here today for routine follow up.

## 2019-08-27 NOTE — HISTORY OF PRESENT ILLNESS
[de-identified] : 45 y/o M 6 months s/p conversion of VSG to gastric bypass presents here today for routine follow up.

## 2019-08-27 NOTE — END OF VISIT
[FreeTextEntry3] : All medical record entries made by the Scribe were at my, Dr. Moran's direction and personally dictated by me on 08/27/2019  I have reviewed the chart and agree that the record accurately reflects my personal performance of the history, physical exam, assessment and plan. I have also personally directed, reviewed, and agreed with the chart.\par \par

## 2019-08-27 NOTE — ADDENDUM
[FreeTextEntry1] : Documented by Raulito Ceballos acting as a scribe for Dr. Harmeet Moran on 08/27/2019\par

## 2019-10-08 ENCOUNTER — APPOINTMENT (OUTPATIENT)
Dept: SURGERY | Facility: CLINIC | Age: 44
End: 2019-10-08
Payer: COMMERCIAL

## 2019-10-08 VITALS
HEIGHT: 74 IN | OXYGEN SATURATION: 99 % | HEART RATE: 68 BPM | DIASTOLIC BLOOD PRESSURE: 77 MMHG | TEMPERATURE: 97.4 F | BODY MASS INDEX: 25.41 KG/M2 | SYSTOLIC BLOOD PRESSURE: 114 MMHG | WEIGHT: 198 LBS

## 2019-10-08 LAB — PREALB SERPL NEPH-MCNC: 11 MG/DL

## 2019-10-08 PROCEDURE — 99213 OFFICE O/P EST LOW 20 MIN: CPT

## 2019-10-08 NOTE — ASSESSMENT
[FreeTextEntry1] : 43 y/o M with PSHx of VSG s/p conversion to gastric bypass on 2/4/19. Patient is doing well and is very happy with his weight loss progress. He is advised to continue his healthy diet and exercise regimen. Patient with malodorous and oily stool. He is advised to take his iron supplements BID instead of Qd to help relief this symptom. Will see nutritionist for dietary adjustment. Patient will also go for blood work. To follow up here in 2 months, December for his next follow up.

## 2019-10-08 NOTE — ADDENDUM
[FreeTextEntry1] : Documented by Raulito Ceballos acting as a scribe for Dr. Harmeet Moran on 10/08/2019\par

## 2019-10-08 NOTE — HISTORY OF PRESENT ILLNESS
[de-identified] : 43 y/o M with PSHx of VSG s/p conversion to gastric bypass on 2/4/19, presents here today for follow up. He reports here feeling well and is happy with his weight loss progress. He reports a weight loss of about 70+lbs since his conversion surgery. He is tolerating his diet with no complications, eating mostly vegetables. He is also staying active with cardio and weight lifting exercises. He does complain of frequent bowel movements, going 5-6x/day. He states that the stool is malodorous with oil present, occurring typically after eating red meat. He is compliant with his vitamins and takes iron qd. \par

## 2019-10-08 NOTE — END OF VISIT
[FreeTextEntry3] : All medical record entries made by the Scribe were at my, Dr. Moran's direction and personally dictated by me on 10/08/2019  I have reviewed the chart and agree that the record accurately reflects my personal performance of the history, physical exam, assessment and plan. I have also personally directed, reviewed, and agreed with the chart.\par \par

## 2019-10-09 LAB
25(OH)D3 SERPL-MCNC: 19.5 NG/ML
ALBUMIN SERPL ELPH-MCNC: 3.6 G/DL
ALP BLD-CCNC: 88 U/L
ALT SERPL-CCNC: 33 U/L
ANION GAP SERPL CALC-SCNC: 12 MMOL/L
AST SERPL-CCNC: 28 U/L
BASOPHILS # BLD AUTO: 0.02 K/UL
BASOPHILS NFR BLD AUTO: 0.6 %
BILIRUB SERPL-MCNC: 0.6 MG/DL
BUN SERPL-MCNC: 11 MG/DL
CA-I SERPL-SCNC: 1.25 MMOL/L
CALCIUM SERPL-MCNC: 8.8 MG/DL
CALCIUM SERPL-MCNC: 8.8 MG/DL
CHLORIDE SERPL-SCNC: 103 MMOL/L
CHOLEST SERPL-MCNC: 121 MG/DL
CHOLEST/HDLC SERPL: 2.1 RATIO
CO2 SERPL-SCNC: 28 MMOL/L
CREAT SERPL-MCNC: 0.94 MG/DL
EOSINOPHIL # BLD AUTO: 0.05 K/UL
EOSINOPHIL NFR BLD AUTO: 1.4 %
ESTIMATED AVERAGE GLUCOSE: 97 MG/DL
FOLATE SERPL-MCNC: 8.6 NG/ML
GLUCOSE SERPL-MCNC: 88 MG/DL
HBA1C MFR BLD HPLC: 5 %
HCT VFR BLD CALC: 36.4 %
HDLC SERPL-MCNC: 59 MG/DL
HGB BLD-MCNC: 11 G/DL
IMM GRANULOCYTES NFR BLD AUTO: 0.3 %
INR PPP: 1.12 RATIO
IRON SATN MFR SERPL: 35 %
IRON SERPL-MCNC: 77 UG/DL
LDLC SERPL CALC-MCNC: 53 MG/DL
LYMPHOCYTES # BLD AUTO: 1.47 K/UL
LYMPHOCYTES NFR BLD AUTO: 42.2 %
MAN DIFF?: NORMAL
MCHC RBC-ENTMCNC: 21.7 PG
MCHC RBC-ENTMCNC: 30.2 GM/DL
MCV RBC AUTO: 71.7 FL
MONOCYTES # BLD AUTO: 0.36 K/UL
MONOCYTES NFR BLD AUTO: 10.3 %
NEUTROPHILS # BLD AUTO: 1.57 K/UL
NEUTROPHILS NFR BLD AUTO: 45.2 %
PARATHYROID HORMONE INTACT: 36 PG/ML
PLATELET # BLD AUTO: 213 K/UL
POTASSIUM SERPL-SCNC: 4.1 MMOL/L
PROT SERPL-MCNC: 6.6 G/DL
PT BLD: 12.7 SEC
RBC # BLD: 5.08 M/UL
RBC # FLD: 19.3 %
SODIUM SERPL-SCNC: 143 MMOL/L
TIBC SERPL-MCNC: 217 UG/DL
TRIGL SERPL-MCNC: 45 MG/DL
TSH SERPL-ACNC: 1.2 UIU/ML
UIBC SERPL-MCNC: 140 UG/DL
VIT B12 SERPL-MCNC: 781 PG/ML
WBC # FLD AUTO: 3.48 K/UL

## 2019-10-10 LAB — ZINC SERPL-MCNC: 52 UG/DL

## 2019-10-12 LAB — VIT A SERPL-MCNC: 16.1 UG/DL

## 2019-10-13 LAB
A-TOCOPHEROL VIT E SERPL-MCNC: 8.7 MG/L
BETA+GAMMA TOCOPHEROL SERPL-MCNC: 0.8 MG/L
VIT B1 SERPL-MCNC: 88 NMOL/L

## 2019-10-31 ENCOUNTER — APPOINTMENT (OUTPATIENT)
Dept: HEMATOLOGY ONCOLOGY | Facility: CLINIC | Age: 44
End: 2019-10-31
Payer: COMMERCIAL

## 2019-10-31 VITALS
SYSTOLIC BLOOD PRESSURE: 105 MMHG | OXYGEN SATURATION: 99 % | HEART RATE: 72 BPM | TEMPERATURE: 98.6 F | DIASTOLIC BLOOD PRESSURE: 67 MMHG | RESPIRATION RATE: 15 BRPM | WEIGHT: 200 LBS | BODY MASS INDEX: 25.67 KG/M2 | HEIGHT: 74 IN

## 2019-10-31 PROCEDURE — 99204 OFFICE O/P NEW MOD 45 MIN: CPT

## 2019-10-31 RX ORDER — INDOMETHACIN 50 MG/1
50 CAPSULE ORAL
Refills: 0 | Status: ACTIVE | COMMUNITY

## 2019-10-31 RX ORDER — COLCHICINE 0.6 MG/1
0.6 TABLET, FILM COATED ORAL
Refills: 0 | Status: ACTIVE | COMMUNITY

## 2019-10-31 RX ORDER — GLUC/MSM/COLGN2/HYAL/ANTIARTH3 375-375-20
TABLET ORAL
Refills: 0 | Status: ACTIVE | COMMUNITY

## 2019-10-31 RX ORDER — PNV NO.95/FERROUS FUM/FOLIC AC 28MG-0.8MG
TABLET ORAL
Refills: 0 | Status: ACTIVE | COMMUNITY

## 2019-10-31 NOTE — HISTORY OF PRESENT ILLNESS
[de-identified] : 44 years old male status post bariatric surgery he is here to discuss his iron deficiency anemia... patient's hemoglobin was 9.5 one year ago he has been taking his phentermine and his most recent hemoglobin is 11gr/dl...

## 2019-10-31 NOTE — ASSESSMENT
[FreeTextEntry1] : Patient's hemoglobin went up by 1.5 g every year with oral Iron alone.. I encouraged patient to continued taking his p.o. iron and have a repeat CBC in 6 months... this point I see no advantage to intravenous iron

## 2020-01-07 ENCOUNTER — APPOINTMENT (OUTPATIENT)
Dept: SURGERY | Facility: CLINIC | Age: 45
End: 2020-01-07
Payer: COMMERCIAL

## 2020-01-07 VITALS
BODY MASS INDEX: 25.84 KG/M2 | HEIGHT: 74 IN | TEMPERATURE: 97.6 F | OXYGEN SATURATION: 99 % | DIASTOLIC BLOOD PRESSURE: 76 MMHG | WEIGHT: 201.38 LBS | SYSTOLIC BLOOD PRESSURE: 128 MMHG | HEART RATE: 68 BPM

## 2020-01-07 DIAGNOSIS — E78.00 PURE HYPERCHOLESTEROLEMIA, UNSPECIFIED: ICD-10-CM

## 2020-01-07 DIAGNOSIS — Z98.84 BARIATRIC SURGERY STATUS: ICD-10-CM

## 2020-01-07 DIAGNOSIS — E56.9 VITAMIN DEFICIENCY, UNSPECIFIED: ICD-10-CM

## 2020-01-07 DIAGNOSIS — I10 ESSENTIAL (PRIMARY) HYPERTENSION: ICD-10-CM

## 2020-01-07 DIAGNOSIS — D50.9 IRON DEFICIENCY ANEMIA, UNSPECIFIED: ICD-10-CM

## 2020-01-07 PROCEDURE — 99213 OFFICE O/P EST LOW 20 MIN: CPT

## 2020-01-07 RX ORDER — COLD-HOT PACK
50 EACH MISCELLANEOUS DAILY
Qty: 30 | Refills: 0 | Status: ACTIVE | COMMUNITY
Start: 2020-01-07 | End: 1900-01-01

## 2020-01-07 RX ORDER — MULTIVIT-MIN NO.58/VIT D3/K 1000-800
CAPSULE ORAL
Qty: 60 | Refills: 5 | Status: ACTIVE | COMMUNITY
Start: 2020-01-07 | End: 1900-01-01

## 2020-01-07 NOTE — ADDENDUM
[FreeTextEntry1] : Documented by Raulito Ceballos acting as a scribe for Dr. Harmeet Moran on 01/07/2020\par

## 2020-01-07 NOTE — ASSESSMENT
[FreeTextEntry1] : 43 y/o M with PSHx of VSG s/p conversion to gastric bypass on 2/4/19, presents here today for follow up. He is doing well. Patient has lost ~70 lbs since surgery and is at an optimal weight. Review of blood test results from October 2019 shows patient to have low Vitamin A and D, Zinc as well as low pre-albumin levels. He is advised to increase protein intake. Will prescribe patient with zinc supplements and multivitamin. Explained to the patient that the gas pain and frequent BM is likely due to his diet. Will see nutritionist for dietary adjustment. To follow up here in 3 months.

## 2020-01-07 NOTE — HISTORY OF PRESENT ILLNESS
[de-identified] : 43 y/o M with PSHx of VSG s/p conversion to gastric bypass on 2/4/19, presents here today for follow up. He reports feeling well and is happy with his weight loss progress. He has lost ~70 lbs since the surgery. He is tolerating his diet with no complications other than increased gas after eating vegetables. Patient reports that when he takes iron supplements, he experiences a burning sensation when urinating. He states that he took Vitamin D for 4 weeks until his prescription ran out on 12/12/19. He has BM 6-7 times a day. He is exercising 4x/week with biking and [employer]. He has been evaluated by an hematologist who reports that his iron level has been increasing and his hematologist would like to stop his iron infusions for now. He is no longer taking HTN medication.

## 2020-01-07 NOTE — END OF VISIT
[FreeTextEntry3] : All medical record entries made by the Scribe were at my, Dr. Moran's direction and personally dictated by me on 01/07/2020  I have reviewed the chart and agree that the record accurately reflects my personal performance of the history, physical exam, assessment and plan. I have also personally directed, reviewed, and agreed with the chart.\par \par

## 2020-04-07 ENCOUNTER — APPOINTMENT (OUTPATIENT)
Dept: SURGERY | Facility: CLINIC | Age: 45
End: 2020-04-07
Payer: COMMERCIAL

## 2020-04-07 PROCEDURE — 99442: CPT

## 2024-07-08 ENCOUNTER — NON-APPOINTMENT (OUTPATIENT)
Age: 49
End: 2024-07-08

## 2024-07-08 ENCOUNTER — TRANSCRIPTION ENCOUNTER (OUTPATIENT)
Age: 49
End: 2024-07-08

## 2024-07-08 ENCOUNTER — APPOINTMENT (OUTPATIENT)
Dept: SURGERY | Facility: CLINIC | Age: 49
End: 2024-07-08
Payer: COMMERCIAL

## 2024-07-08 VITALS
DIASTOLIC BLOOD PRESSURE: 71 MMHG | BODY MASS INDEX: 26.18 KG/M2 | WEIGHT: 204 LBS | SYSTOLIC BLOOD PRESSURE: 106 MMHG | HEIGHT: 74 IN | OXYGEN SATURATION: 99 % | HEART RATE: 66 BPM

## 2024-07-08 DIAGNOSIS — Z98.84 BARIATRIC SURGERY STATUS: ICD-10-CM

## 2024-07-08 PROCEDURE — 99204 OFFICE O/P NEW MOD 45 MIN: CPT

## 2024-11-12 NOTE — PRE-OP CHECKLIST - WEIGHT IN KG
[facility] MEDICINE PROGRESS NOTE   Patient: Brian Behrens  Today's Date: 11/12/2024    YOB: 1958  Admission Date: 11/11/2024    MRN: [id number]  Inpatient LOS: 1    Room: 275/01  Hospital Day: Hospital Day: 2    Subjective   HISTORY AND SUBJECTIVE COMPLAINTS     Chief Complaint:   Symptomatic Bradycardia     Interval History / Subjective:   Overnight patient developed nausea and vomiting with worsening abdominal distention.  NG tube placed with approximately 500 cc of bilious drainage since placement.  X-ray post placement revealing multiple dilated air-filled loops of bowel throughout the upper abdomen revealing ileus or bowel obstruction  CT abdomen and pelvis without contrast pending  Symptomatic bradycardia and hypotension now resolved, patient no longer dizzy.  Cardiology continues to follow  Electrolyte abnormalities improving, nephrology consulted and following    He reports mild dull headache otherwise no dizziness, chest pain, shortness of breath, abdominal pain at rest, ongoing nausea/vomiting.  Kimble catheter placed for accurate I's and O's    Hospital Course:  Patient is a 66-year-old male with PMH significant for diastolic dysfunction, hypertension/hyperlipidemia, lone atrial fibrillation, EtOH abuse complicated by prior seizure history as well as alcoholic cirrhosis of the liver who presents to the ED via EMS due to symptomatic bradycardia.    Per review of the EMR and discussion with the patient, he has been experiencing worsening dizziness with activity as well as worsening shortness of breath for approximately 3 days.  EMS was called and found the patient to have a heart rate in the 40s, hypotensive w/ SBP in the 80s.  He was given 2 doses of 1 mg atropine w/ some improvement in his HR and BP.  He was paced w/ HR of 70, external pacing was discontinued on arrival to ED.  EKG in ED obtained revealing ongoing bradycardia with questionable junctional rhythm  versus A-fib pending cardiology review.     Workup completed in the ED revealed electrolyte abnormalities including hyponatremia of 123, hyperkalemia of 5.6, hypochloremia 91 as well as hyperphosphatemia 5.3.  Concerns for TOÑO with BUN/CR 15/2.20.  Baseline creatinine appears to be 0.8-1.  Hypomagnesemia 1.2.  Hyperbilirubinemia, T. bili 2.5 as well as D bili 1.5.  AST 60.  Troponin negative at 4.  NT proBNP 917 CBC with leukocytosis of 12.0.  H/H10.7/30.2 and platelets 220.  PT/INR 15.4/1.5.  He was given 1 g of calcium gluconate as well as 1 g of mag sulfate.  XR chest negative for acute pathology.  EP was contacted for [facility] and recommendation was to admit the patient at [facility] with discontinuation of beta-blocker and calcium channel blocker.     Symptomatic bradycardia now resolved with improvement in heart rate and blood pressure.  Appears that patient is converted into sinus rhythm and no longer in atrial fibrillation.  Per, cardiology recommendations patient will be placed on IV Metoprolol due to concerns for rebound tachycardia and will DC diltiazem. Once tolerating PO he will be transitioned to Toprolol XL 50mg daily.  Given Hx of afib s/p ablation, CHADSVASC 1, no recommendations for AC 2/2 liver disease, low score, and Hx falls.      Concerns for Ileus vs bowel obstruction. 11/12 developed worsening abdominal distention with N/V. NGT placed with 500cc bilious drainage. X-ray post placement revealing multiple dilated air-filled loops of bowel throughout the upper abdomen revealing ileus or bowel obstruction.  CT A/P pending given hx of hernia and new onset of symptoms.      ROS:  Pertinent systems negative except as above.    Objective   PHYSICAL EXAMINATION     Vital 24 Hour Range Most Recent Value   Temperature Temp  Min: 96.6 °F (35.9 °C)  Max: 98.7 °F (37.1 °C) 98.7 °F (37.1 °C)   Pulse Pulse  Min: 43  Max: 84 84   Respiratory Resp  Min: 15  Max: 28 (!) 25   Blood Pressure BP  Min:  85/48  Max: 127/67 115/59   Pulse Oximetry SpO2  Min: 90 %  Max: 99 % 92 %   Arterial BP No data recorded     O2 No data recorded       Recorded Intake and Output:  Intake/Output Summary (Last 24 hours) at 11/12/2024 0942  Last data filed at 11/12/2024 0917  Gross per 24 hour   Intake 881.38 ml   Output 2715 ml   Net -1833.62 ml      Recorded Last Stool Occurrence:       Vital Most Recent Value First Value   Weight 123.5 kg (272 lb 4.3 oz) Weight: 120.1 kg (264 lb 12.4 oz)   Height 6' 2\" (188 cm) Height: 6' 2\" (188 cm)   BMI 34.96 N/A     Physical Exam:  General: Acutely ill appearing male, NAD does not appear toxic   HEENT: Normocephalic, atraumatic.  Pupils equal round reactive to light, nares patent with NG tube present.  Oral mucosa appears dry.   CV: Distant heart sounds, appears SR, no murmur   Resp: Symmetric chest rise with normal respiratory effort, improvement in bibasilar crackles, occasional subtle wheezing.  No rhonchi. On RA.    Abd: Large round and taut, hx of umbilical hernia present on admission. Tympanic throughout, mild LUQ pain with palpation otherwise no rebound tenderness or guarding. Hypoactive BS noted throughout.    Ext: Improvement in pitting edema/anasarca, some erythema to the LE- stable   Neuro: Awake, alert and oriented x 3. Face appears symmetric, tongue ML and speech clear and fluent. PERRL, EOMs intact. Extremities move purposefully and spont, generalized weakness noted.   Psych:A&O x 3, calm and cooperative     TEST RESULTS     Labs: The Laboratory values listed below have been reviewed and pertinent findings discussed in the Assessment and Plan.    Laboratory values:   Recent Labs   Lab 11/12/24  0605 11/11/24  0902 11/05/24  1119   WBC 10.8 12.0* 9.5   HGB 10.0* 10.7* 11.1*   HCT 27.3* 30.2* 32.2*    220 218         Recent Labs   Lab 11/12/24  0605 11/12/24  0013 11/11/24  1900 11/11/24  1453 11/11/24  0902   SODIUM 125* 124* 126* 123* 123*   POTASSIUM 4.5 5.1 5.5* 5.9*  5.6*   CHLORIDE 91* 91* 92* 92* 91*   CO2 26 24 24 21 22   CALCIUM 8.7  --   --  8.5 8.4   GLUCOSE 89  --   --  118* 111*   BUN 20  --   --  19 15   CREATININE 1.90*  --   --  2.16* 2.20*   MG 1.6*  --   --  1.5* 1.2*        Recent Labs   Lab 11/12/24  0605 11/11/24  0913 11/11/24  0902 11/05/24  1119   ALBUMIN 2.3*  --  2.0* 2.3*   PHOS  --  5.3*  --   --    AST 49*  --  60* 63*   GPT 22  --  24 27   GGTP  --   --   --  193*   BILIRUBIN 4.0*  --  2.5* 3.4*   DBIL  --   --  1.5*  --      Recent Labs   Lab 11/11/24  0902   HTROPI 4   NTPROB 917*       Lab Results   Component Value Date    VB12 940 (H) 11/11/2024    LUZ MARINA 2.3 (L) 11/11/2024    TSH 3.032 11/05/2024    HGBA1C 5.3 11/01/2023        Lab Results   Component Value Date    CHOLESTEROL 175 11/05/2024    HDL 48 11/05/2024    CALCLDL 107 11/05/2024        Lab Results   Component Value Date    USPG 1.015 11/11/2024    UPROT Negative 11/11/2024    UWBC Negative 11/11/2024    URBC Small (A) 11/11/2024    UNITR Negative 11/11/2024    UPH 5.0 11/11/2024    UBACTRA Few (A) 11/11/2024         Radiology: Imaging studies have been reviewed and pertinent findings discussed in the Assessment and Plan.  Results for orders placed or performed during the hospital encounter of 11/11/24 (from the past 48 hour(s))   XR CHEST AP OR PA    Impression    IMPRESSION:    1. No acute processes.    Electronically Signed by: Jamil Gómez MD  Signed on: 11/11/2024 9:47 AM  Created on Workstation ID: [id number]  Signed on Workstation ID: [id number]   XR NASOGASTRIC TUBE CHECK ABDOMEN    Impression    IMPRESSION:     1. Nasogastric tube tip in the stomach with sideport above the  gastroesophageal junction. This could be advanced 5 cm for more optimal  positioning.  2. Multiple dilated air-filled loops of bowel throughout the upper abdomen.  This could reflect ileus or bowel obstruction.      Electronically Signed by: Edward Najera MD  Signed on: 11/12/2024 3:06 AM  Created on  Workstation ID: [id number]  Signed on Workstation ID: [id number]        ANCILLARY ORDERS     Diet:  Npo Diet Without Exceptions  Telemetry: On  Consults:    IP CONSULT TO CARDIOLOGY  IP CONSULT TO NEPHROLOGY  Therapy Orders:   PT and OT Orders Placed this Encounter   Procedures    Occupational Therapy Evaluation    Occupational Therapy Treatment    Physical Therapy Evaluation    Physical Therapy Treatment       ADVANCED DIRECTIVES     Code Status: Full Resuscitation       ASSESSMENT AND PLAN     Concerns for Ileus vs SBO  Hx of Umbilical Hernia   - 11/12 new onset of N/V with worsening abdominal distention   - NGT placed with 500cc bilious drainage since placement   - 11/12 XR post NGT placement -multiple dilated air-filled loops of bowel throughout the upper abdomen revealing ileus or bowel obstruction  - CT A/P pending  - General Surgery, Dr. Case consulted, appreciate recommendations  - Continue Npo, will attempt to make most medications IV as possible   - Holding ASA and SQH until DX imaging resulted due to possible need for intervention      Concerns for Alcohol withdrawal with complication  Hyperbilirubinemia  Elevated AST  Elevated alk phosphatase  Hx Of Alcoholic cirrhosis of the liver   -Last drink night PTA  -Hx of severe/complicated withdrawal   -UDS + for Fentanyl- did receive by EMS   -AST 49 (60)  -ALT 22 (24)  -Total bilirubin 4.0 (2.5)  -D bili 1.5  -Alkphos 109 (124), appears chronically elevated 120-130's  -PT/INR 15.5/1.5 (15.4/1.5)  -CIWA Total Score: 5 (11/12/24 0312)  -Thiamine, folate, and MVI- when able to take PO  -encouraged complete alcohol cessation  -holding PRN medications given the above  -Likely place on non-escalating dose Ativan for withdrawal     Anasarca   Elevated BNP   Hx of Diastolic Dysfunction   Ejection Fraction   Date Value Ref Range Status   11/11/2024 64 % Final   - NT proBNP 917, Troponin WNL   - 11/11 TTE w/ EF above, indeterminate diastolic function moderately  increased left atrial chamber size mild to moderate mitral valve regurgitation  - Albumin and Lasix drip per nephrology as noted below    - Cardiology following as noted below     TOÑO  Electrolyte ABN   Hyponatremia/hypochloremia  Hypomagnesemia  Hyperphosphatemia  Hyperkalemia  - Baseline Cr Baseline appears to be 0.8-1  Creatinine (mg/dL)   Date Value   11/12/2024 1.90 (H)   - Na 125 (123), Cl 91(91)- appears chronic Na 120-130's in the past   - Potassium 4.5 (5.6)  - Phos 5.3 on admission   - Mag 1.6 (1.2)  - Lasix and Spironolactone PTA- Held   - Lasix drip and IV Albumin per nephrology   - Nephrology consulted, Dr. Zamora, appreciate recommendations     Symptomatic Bradycardia- resolved   Hypotension- resolved   Hx of Afib s/p ablation  Hx HTN/HLD   BP  Min: 85/48  Max: 127/67; Pulse  Min: 43  Max: 84  - 12 lead EKG revealing bradycardia, concerning for A fib  - s/p external pacing and 2 doses of atropine in the field   - External pacing d/c'd  - EP consulted via ED at , recs- monitor off BB and CCB  - Cardiology consulted, Dr. Olivas, symptoms likely do medications  - Resume Metoprolol due to concerns for rebound tachycardia   - Metoprolol IV, transition to PO once able (50mg XL daily)  - Atropine at the bedside  - Will not resume CCB on DC     Normocytic Anemia   Leukocytosis - resolved   WBC (K/mcL)   Date Value   11/12/2024 10.8   - H/H 10/27.3 (10.7/30.2)  - Repeat CBC In AM     Hx GERD  -PPI when able to take PO, will likely transition to IV     Hx Neuropathy   - Holding Gabapentin, per review appears patient may not take this consistently   - Will resume as indicated  - B12 940  and Folate 2.3  - Supplements as above         F/E/N  Saline Lock   Monitor lytes and replace PRN   NPO     Smoking status: former smoker    Nutrition status: appropriate  Body mass index is 34.96 kg/m². - Obese (BMI 30-39 without a comorbid condition or 30-34 with a comorbid condition)  DVT Prophylaxis: SCDs        Disposition: ICU  DISCHARGE PLANNING     The patient's treatment plans were discussed with patient. All questions and concerns were discussed.      Recommendations for Discharge   SW     PT     OT     SLP        Anticipated discharge destination:  TBD  Expected Discharge Date: TBD  Barriers to Discharge: Patient is not medically ready and needs to remain in the hospital today due to the above       Lisa Balderramaspitalist Team  11/12/2024  9:42 AM     Discussed case with supervising physician Anita Leblanc, DO - see attestation.     (Contact by secure chat)   Available on [facility] for any questions.   Please call for urgent matters.  After 7pm please page the Hospitalist on-call.       Agree with note as documented. Patient clinically improving with diuresis, renal function improved, now with some element of withdrawal setting in . Scheduled IV benzo for now v scheduled librium to continue. Continue diuresis, appreciate the help from surgery for concern of lleus, no surgical intervention, continue NG to LIWS and OOBTC when able.    Benji    128.7